# Patient Record
Sex: MALE | Race: WHITE
[De-identification: names, ages, dates, MRNs, and addresses within clinical notes are randomized per-mention and may not be internally consistent; named-entity substitution may affect disease eponyms.]

---

## 2020-09-09 ENCOUNTER — HOSPITAL ENCOUNTER (EMERGENCY)
Dept: HOSPITAL 97 - ER | Age: 40
Discharge: HOME | End: 2020-09-09
Payer: COMMERCIAL

## 2020-09-09 VITALS — DIASTOLIC BLOOD PRESSURE: 104 MMHG | SYSTOLIC BLOOD PRESSURE: 147 MMHG | TEMPERATURE: 98 F | OXYGEN SATURATION: 100 %

## 2020-09-09 DIAGNOSIS — L50.9: ICD-10-CM

## 2020-09-09 DIAGNOSIS — L03.211: Primary | ICD-10-CM

## 2020-09-09 PROCEDURE — 96372 THER/PROPH/DIAG INJ SC/IM: CPT

## 2020-09-09 PROCEDURE — 99283 EMERGENCY DEPT VISIT LOW MDM: CPT

## 2020-09-09 NOTE — ER
Nurse's Notes                                                                                     

 [facility]                                                                 

Name: Albert Potter                                                                                

Age: 40 yrs                                                                                       

Sex: Male                                                                                         

: 1980                                                                                   

MRN: [id number]                                                                                   

Arrival Date: 2020                                                                          

Time: 07:16                                                                                       

Account#: [id number]                                                                            

Bed 17                                                                                            

Private MD:                                                                                       

Diagnosis: Cellulitis of face;Urticaria, unspecified                                              

                                                                                                  

Presentation:                                                                                     

                                                                                             

07:23 Chief complaint: Patient states: rash on skin around eyes, started Saturday, no other   iw  

      area affected.                                                                              

07:23 Method Of Arrival: Ambulatory                                                           iw  

07:23 Coronavirus screen: At this time, the client does not indicate any symptoms associated  iw  

      with coronavirus-19. Ebola Screen: Patient negative for fever greater than or equal to      

      101.5 degrees Fahrenheit, and additional compatible Ebola Virus Disease symptoms            

      Patient denies exposure to infectious person. Patient denies travel to an                   

      Ebola-affected area in the 21 days before illness onset. No symptoms or risks               

      identified at this time. Initial Sepsis Screen: Does the patient meet any 2 criteria?       

      No. Patient's initial sepsis screen is negative. Does the patient have a suspected          

      source of infection? No. Patient's initial sepsis screen is negative. Risk Assessment:      

      Do you want to hurt yourself or someone else? Patient reports no desire to harm self or     

      others.                                                                                     

07:23 Acuity: HUMERA 5                                                                           iw  

07:36 Onset of symptoms was 2020.                                               iw  

                                                                                                  

Historical:                                                                                       

- Allergies:                                                                                      

07:25 No Known Allergies;                                                                     iw  

- Home Meds:                                                                                      

07:25 None [Active];                                                                          iw  

- PMHx:                                                                                           

07:25 None;                                                                                   iw  

- PSHx:                                                                                           

07:25 None;                                                                                   iw  

                                                                                                  

- Immunization history:: Adult Immunizations.                                                     

- Social history:: Smoking status: .                                                              

- Family history:: not pertinent.                                                                 

- Hospitalizations: : No recent hospitalization is reported.                                      

                                                                                                  

                                                                                                  

Screenin:26 Abuse screen: Denies threats or abuse. Denies injuries from another. Nutritional        iw  

      screening: No deficits noted. Tuberculosis screening: No symptoms or risk factors           

      identified. Fall Risk None identified.                                                      

                                                                                                  

Assessment:                                                                                       

07:25 General: Appears in no apparent distress. comfortable, Behavior is calm, cooperative.   iw  

      Pain: Denies pain. Neuro: Level of Consciousness is awake, alert, obeys commands,           

      Oriented to person, place, time, situation, Moves all extremities. Full function.           

      Cardiovascular: Patient's skin is warm and dry. Respiratory: Respiratory effort is          

      even, unlabored, Respiratory pattern is regular, symmetrical. GI: No signs and/or           

      symptoms were reported involving the gastrointestinal system. Derm: Skin is intact,         

      Rash noted that is red, urticaria, on face. Musculoskeletal: Range of motion: intact in     

      all extremities.                                                                            

                                                                                                  

Vital Signs:                                                                                      

07:23  / 104; Pulse 98; Resp 16 S; Temp 98.0; Pulse Ox 100% on R/A;                     iw  

                                                                                                  

ED Course:                                                                                        

07:16 Patient arrived in ED.                                                                  as  

07:18 Owen Cantu MD is Attending Physician.                                                rn  

07:24 Triage completed.                                                                       iw  

07:25 Arm band placed on.                                                                     iw  

07:25 Patient has correct armband on for positive identification.                             iw  

07:31 Jocelyne Mckeon RN is Primary Nurse.                                                   iw  

07:36 No provider procedures requiring assistance completed. Patient did not have IV access   iw  

      during this emergency room visit.                                                           

                                                                                                  

Administered Medications:                                                                         

07:31 Drug: Decadron 10 mg Route: IM; Site: right deltoid;                                    iw  

07:41 Follow up: Response: No adverse reaction                                                iw  

07:32 Drug: Bactrim (160 mg-800 mg (DS) 1 tablet Route: PO;                                   iw  

07:41 Follow up: Response: No adverse reaction                                                iw  

                                                                                                  

                                                                                                  

Outcome:                                                                                          

07:29 Discharge ordered by MD.                                                                rn  

07:36 Discharged to home ambulatory.                                                          iw  

07:36 Condition: good                                                                             

07:36 Discharge instructions given to patient, Instructed on discharge instructions, follow       

      up and referral plans. Demonstrated understanding of instructions, follow-up care,          

      medications, Prescriptions given X 2.                                                       

07:37 Patient left the ED.                                                                    iw  

                                                                                                  

Signatures:                                                                                       

Connie Emery                             as                                                   

Jocelyne Mckeon, TONY                     RN   iw                                                   

Owen Cantu MD MD   rn                                                   

                                                                                                  

**************************************************************************************************

## 2020-09-09 NOTE — EDPHYS
Physician Documentation                                                                           

 [facility]                                                                 

Name: Albert Potter                                                                                

Age: 40 yrs                                                                                       

Sex: Male                                                                                         

: 1980                                                                                   

MRN: [id number]                                                                                   

Arrival Date: 2020                                                                          

Time: 07:16                                                                                       

Account#: [id number]                                                                            

Bed 17                                                                                            

Private MD:                                                                                       

ED Physician Owen Cantu                                                                         

HPI:                                                                                              

                                                                                             

07:25 This 40 yrs old  Male presents to ER via Ambulatory with complaints of Rash.   rn  

07:25 The patient's rash thought to be caused by an unknown cause. The rash is located on the rn  

      face. The rash can be described as erythematous, urticarial.                                

07:25 Onset: The symptoms/episode began/occurred 4 day(s) ago. Severity of symptoms: At their rn  

      worst the symptoms were mild in the emergency department the symptoms are unchanged.        

      The patient has not experienced similar symptoms in the past. Reports 4 days ago began      

      to notice red splotches around eyes, feels burning and itchy, both eyes, no injury or       

      splash/chemical exposure, concerned maybe glasses had something on them, vision ok. No      

      fever. No rash anywhere else. + itchy. No medications or medical problems. .                

                                                                                                  

Historical:                                                                                       

- Allergies:                                                                                      

07:25 No Known Allergies;                                                                     iw  

- Home Meds:                                                                                      

07:25 None [Active];                                                                          iw  

- PMHx:                                                                                           

07:25 None;                                                                                   iw  

- PSHx:                                                                                           

07:25 None;                                                                                   iw  

                                                                                                  

- Immunization history:: Adult Immunizations.                                                     

- Social history:: Smoking status: .                                                              

- Family history:: not pertinent.                                                                 

- Hospitalizations: : No recent hospitalization is reported.                                      

                                                                                                  

                                                                                                  

ROS:                                                                                              

07:25 Constitutional: Negative for fever, chills, and weight loss, Eyes: Negative for injury, rn  

      and discharge, Neck: Negative for injury, pain, and swelling, Cardiovascular: Negative      

      for chest pain, palpitations, and edema, Respiratory: Negative for shortness of breath,     

      cough, wheezing, and pleuritic chest pain, Abdomen/GI: Negative for abdominal pain,         

      nausea, vomiting, diarrhea, and constipation, MS/Extremity: Negative for injury and         

      deformity, Skin: + rash and redness around eyes. Neuro: Negative for headache,              

      weakness, numbness, tingling, and seizure.                                                  

                                                                                                  

Exam:                                                                                             

07:25 Constitutional:  This is a well developed, well nourished patient who is awake, alert,  rn  

      and in no acute distress. Head/Face:  Normocephalic, atraumatic. Eyes:  Pupils equal        

      round and reactive to light, extra-ocular motions intact.  Lids and lashes normal.          

      Conjunctiva and sclera are non-icteric and not injected.  Cornea within normal limits.      

      Bilateral periorbital areas with urticarial lesions, no pustules, no drainage, no           

      fluctuance, + mild edema.  ENT:  No intraoral lesions. Respiratory:  No increased work      

      of breathing, no retractions or nasal flaring. Skin:  Warm, dry                             

                                                                                                  

Vital Signs:                                                                                      

07:23  / 104; Pulse 98; Resp 16 S; Temp 98.0; Pulse Ox 100% on R/A;                     iw  

                                                                                                  

MDM:                                                                                              

07:18 Patient medically screened.                                                             rn  

07:25 Differential diagnosis: allergic reaction, cellulitis, impetigo. Data reviewed: vital   rn  

      signs, nurses notes, and as a result, I will discharge patient. Counseling: I had a         

      detailed discussion with the patient and/or guardian regarding: the historical points,      

      exam findings, and any diagnostic results supporting the discharge/admit diagnosis, the     

      need for outpatient follow up, to return to the emergency department if symptoms worsen     

      or persist or if there are any questions or concerns that arise at home. Special            

      discussion: I discussed with the patient/guardian in detail that at this point there is     

      no indication for admission to the hospital. It is understood, however, that if the         

      symptoms persist or worsen the patient needs to return immediately for re-evaluation.       

                                                                                                  

Administered Medications:                                                                         

07:31 Drug: Decadron 10 mg Route: IM; Site: right deltoid;                                    iw  

07:41 Follow up: Response: No adverse reaction                                                iw  

07:32 Drug: Bactrim (160 mg-800 mg (DS) 1 tablet Route: PO;                                   iw  

07:41 Follow up: Response: No adverse reaction                                                iw  

                                                                                                  

                                                                                                  

Disposition:                                                                                      

20 07:29 Discharged to Home. Impression: Cellulitis of face, Urticaria, unspecified.        

- Condition is Stable.                                                                            

- Discharge Instructions: Cellulitis, Adult, Rash.                                                

- Prescriptions for Prednisone 20 mg Oral Tablet - take 3 tablet by ORAL route once               

  daily for 5 days; 15 tablet. Bactrim - 160 mg Oral Tablet - take 1 tablet by              

  ORAL route every 12 hours for 10 days; 20 tablet.                                               

- Medication Reconciliation Form, Thank You Letter, Antibiotic Education, Prescription            

  Opioid Use form.                                                                                

- Follow up: Private Physician; When: As needed; Reason: Recheck today's complaints,              

  Re-evaluation by your physician.                                                                

- Problem is new.                                                                                 

- Symptoms are unchanged.                                                                         

                                                                                                  

                                                                                                  

                                                                                                  

Signatures:                                                                                       

Jocelyne Mckeon RN                     RN   iw                                                   

Owen Cantu MD MD   rn                                                   

                                                                                                  

Corrections: (The following items were deleted from the chart)                                    

07:37 07:29 2020 07:29 Discharged to Home. Impression: Cellulitis of face; Urticaria,   iw  

      unspecified. Condition is Stable. Forms are Medication Reconciliation Form, Thank You       

      Letter, Antibiotic Education, Prescription Opioid Use. Follow up: Private Physician;        

      When: As needed; Reason: Recheck today's complaints, Re-evaluation by your physician.       

      Problem is new. Symptoms are unchanged. rn                                                  

                                                                                                  

**************************************************************************************************

## 2020-11-04 ENCOUNTER — HOSPITAL ENCOUNTER (EMERGENCY)
Dept: HOSPITAL 97 - ER | Age: 40
Discharge: HOME | End: 2020-11-04
Payer: COMMERCIAL

## 2020-11-04 VITALS — OXYGEN SATURATION: 98 % | TEMPERATURE: 97.3 F

## 2020-11-04 VITALS — SYSTOLIC BLOOD PRESSURE: 126 MMHG | DIASTOLIC BLOOD PRESSURE: 84 MMHG

## 2020-11-04 DIAGNOSIS — L23.9: Primary | ICD-10-CM

## 2020-11-04 PROCEDURE — 96372 THER/PROPH/DIAG INJ SC/IM: CPT

## 2020-11-04 PROCEDURE — 99283 EMERGENCY DEPT VISIT LOW MDM: CPT

## 2020-11-04 NOTE — EDPHYS
Physician Documentation                                                                           

 [facility]                                                                 

Name: Albert Potter                                                                                

Age: 40 yrs                                                                                       

Sex: Male                                                                                         

: 1980                                                                                   

MRN: [id number]                                                                                   

Arrival Date: 2020                                                                          

Time: 12:48                                                                                       

Account#: [id number]                                                                            

Bed 14                                                                                            

Private MD:                                                                                       

ED Physician Owen Cantu                                                                         

HPI:                                                                                              

                                                                                             

13:59 This 40 yrs old  Male presents to ER via Ambulatory with complaints of Rash,   kb  

      Eye Swelling.                                                                               

13:59 The patient's rash thought to be caused by an unknown cause. The rash is located on the kb  

      right eye, left eye, right arm and left arm. The rash can be described as erythematous.     

      Onset: The symptoms/episode began/occurred last night. Associated signs and symptoms:       

      Pertinent positives: itching, Pertinent negatives: burning sensation, difficulty            

      breathing, fever, nausea, Pain swelling of lips, swelling of throat, swelling of            

      tongue, vomiting, wheezing. Severity of symptoms: At their worst the symptoms were mild     

      in the emergency department the symptoms are unchanged. The patient has experienced a       

      previous episode. The patient has not recently seen a physician. Pt reports he noticed      

      itching to arms and eyes last night, this morning he had a rash to those areas. States      

      he has had this before, came here and got a shot that helped a lot. Came in now before      

      it got worse.                                                                               

                                                                                                  

Historical:                                                                                       

- Allergies:                                                                                      

13:10 No Known Allergies;                                                                     jd3 

- Home Meds:                                                                                      

13:10 None [Active];                                                                          jd3 

- PMHx:                                                                                           

13:10 None;                                                                                   jd3 

- PSHx:                                                                                           

13:10 left ankle; back;                                                                       jd3 

                                                                                                  

- Immunization history:: Adult Immunizations up to date.                                          

- Social history:: Smoking status: Patient denies any tobacco usage or history of.                

                                                                                                  

                                                                                                  

ROS:                                                                                              

13:58 Constitutional: Negative for fever, chills, and weight loss, Cardiovascular: Negative   kb  

      for chest pain, palpitations, and edema, Respiratory: Negative for shortness of breath,     

      cough, wheezing, and pleuritic chest pain, Abdomen/GI: Negative for abdominal pain,         

      nausea, vomiting, diarrhea, and constipation, Back: Negative for injury and pain,           

      MS/Extremity: Negative for injury and deformity, Neuro: Negative for headache,              

      weakness, numbness, tingling, and seizure.                                                  

13:58 Skin: Positive for rash, of the right eye, left eye, right arm and left arm.                

                                                                                                  

Exam:                                                                                             

13:58 Constitutional:  This is a well developed, well nourished patient who is awake, alert,  kb  

      and in no acute distress. Head/Face:  Normocephalic, atraumatic. Chest/axilla:  Normal      

      chest wall appearance and motion.  Nontender with no deformity.  No lesions are             

      appreciated. Cardiovascular:  Regular rate and rhythm with a normal S1 and S2.  No          

      gallops, murmurs, or rubs.  Normal PMI, no JVD.  No pulse deficits. Respiratory:  Lungs     

      have equal breath sounds bilaterally, clear to auscultation and percussion.  No rales,      

      rhonchi or wheezes noted.  No increased work of breathing, no retractions or nasal          

      flaring. Abdomen/GI:  Soft, non-tender, with normal bowel sounds.  No distension or         

      tympany.  No guarding or rebound.  No evidence of tenderness throughout. MS/ Extremity:     

       Pulses equal, no cyanosis.  Neurovascular intact.  Full, normal range of motion.           

      Neuro:  Awake and alert, GCS 15, oriented to person, place, time, and situation.            

      Cranial nerves II-XII grossly intact.  Motor strength 5/5 in all extremities.  Sensory      

      grossly intact.  Cerebellar exam normal.  Normal gait.                                      

13:58 Skin: rash a mild rash is noted, consistent with  contact dermatitis, on the left arm       

      and right arm and left eye and right eye.                                                   

                                                                                                  

Vital Signs:                                                                                      

13:10  / 90; Pulse 84; Resp 17 S; Temp 97.3(TE); Pulse Ox 98% on R/A; Weight 113.4 kg   jd3 

      (R); Height 6 ft. 0 in. (182.88 cm) (R); Pain 0/10;                                         

14:23  / 84; Pulse 81; Resp 16 S; Pulse Ox 98% on R/A;                                  ca1 

13:10 Body Mass Index 33.91 (113.40 kg, 182.88 cm)                                            jd3 

                                                                                                  

MDM:                                                                                              

13:46 Patient medically screened.                                                             kb  

13:58 Data reviewed: vital signs, nurses notes. Data interpreted: Pulse oximetry: on room air kb  

      is 98 %. Interpretation: normal. Counseling: I had a detailed discussion with the           

      patient and/or guardian regarding: the historical points, exam findings, and any            

      diagnostic results supporting the discharge/admit diagnosis, the need for outpatient        

      follow up, a family practitioner, to return to the emergency department if symptoms         

      worsen or persist or if there are any questions or concerns that arise at home.             

                                                                                                  

Administered Medications:                                                                         

14:03 Drug: SOLU-Medrol 125 mg Route: IM; Site: right gluteus;                                ca1 

14:23 Follow up: Response: No adverse reaction                                                ca1 

                                                                                                  

                                                                                                  

Disposition:                                                                                      

16:31 Co-signature as Attending Physician, Owen Cantu MD.                                    rn  

                                                                                                  

Disposition:                                                                                      

20 14:01 Discharged to Home. Impression: Allergic contact dermatitis.                       

- Condition is Stable.                                                                            

- Discharge Instructions: Contact Dermatitis, Easy-to-Read.                                       

- Prescriptions for Pepcid 20 mg Oral Tablet - take 1 tablet by ORAL route every 12               

  hours for 5 days; 10 tablet. Prednisone 20 mg Oral Tablet - take 1 tablet by ORAL               

  route once daily for 5 days; 5 tablet.                                                          

- Medication Reconciliation Form, Thank You Letter, Antibiotic Education, Prescription            

  Opioid Use form.                                                                                

- Follow up: Emergency Department; When: As needed; Reason: Worsening of condition.               

  Follow up: Private Physician; When: 2 - 3 days; Reason: Recheck today's complaints,             

  Continuance of care, Re-evaluation by your physician.                                           

                                                                                                  

                                                                                                  

                                                                                                  

Signatures:                                                                                       

Ivana Arteaga, SAFIAP-C                 FNP-Ckb                                                   

Owen Cantu MD MD rn Davies, Jonathon, RN                    RN   jd3                                                  

Candi Renteria RN                        RN   ca1                                                  

                                                                                                  

Corrections: (The following items were deleted from the chart)                                    

14:26 14:01 2020 14:01 Discharged to Home. Impression: Allergic contact dermatitis.     ca1 

      Condition is Stable. Forms are Medication Reconciliation Form, Thank You Letter,            

      Antibiotic Education, Prescription Opioid Use. Follow up: Emergency Department; When:       

      As needed; Reason: Worsening of condition. Follow up: Private Physician; When: 2 - 3        

      days; Reason: Recheck today's complaints, Continuance of care, Re-evaluation by your        

      physician. kb                                                                               

                                                                                                  

**************************************************************************************************

## 2020-11-04 NOTE — ER
Nurse's Notes                                                                                     

 [facility]                                                                 

Name: Albert Potter                                                                                

Age: 40 yrs                                                                                       

Sex: Male                                                                                         

: 1980                                                                                   

MRN: [id number]                                                                                   

Arrival Date: 2020                                                                          

Time: 12:48                                                                                       

Account#: [id number]                                                                            

Bed 14                                                                                            

Private MD:                                                                                       

Diagnosis: Allergic contact dermatitis                                                            

                                                                                                  

Presentation:                                                                                     

                                                                                             

13:09 Chief complaint: Patient states: "I have this rash popping up and I don't know why.".   jd3 

      Coronavirus screen: At this time, the client does not indicate any symptoms associated      

      with coronavirus-19. Ebola Screen: Patient negative for fever greater than or equal to      

      101.5 degrees Fahrenheit, and additional compatible Ebola Virus Disease symptoms.           

      Initial Sepsis Screen: Does the patient meet any 2 criteria? No. Patient's initial          

      sepsis screen is negative. Does the patient have a suspected source of infection? No.       

      Patient's initial sepsis screen is negative. Risk Assessment: Do you want to hurt           

      yourself or someone else? Patient reports no desire to harm self or others. Onset of        

      symptoms was 2020.                                                             

13:09 Method Of Arrival: Ambulatory                                                           jd3 

13:09 Acuity: HUMERA 4                                                                           jd3 

                                                                                                  

Historical:                                                                                       

- Allergies:                                                                                      

13:10 No Known Allergies;                                                                     jd3 

- Home Meds:                                                                                      

13:10 None [Active];                                                                          jd3 

- PMHx:                                                                                           

13:10 None;                                                                                   jd3 

- PSHx:                                                                                           

13:10 left ankle; back;                                                                       jd3 

                                                                                                  

- Immunization history:: Adult Immunizations up to date.                                          

- Social history:: Smoking status: Patient denies any tobacco usage or history of.                

                                                                                                  

                                                                                                  

Screenin:50 Abuse screen: Denies threats or abuse. Denies injuries from another. Nutritional        ca1 

      screening: No deficits noted. Tuberculosis screening: No symptoms or risk factors           

      identified. Fall Risk None identified.                                                      

                                                                                                  

Assessment:                                                                                       

13:50 General: Appears in no apparent distress. comfortable, Behavior is calm, cooperative,   ca1 

      appropriate for age. Pain: Denies pain. Neuro: Level of Consciousness is awake, alert,      

      obeys commands, Oriented to person, place, time, situation. Derm: Skin is intact, is        

      healthy with good turgor, Skin is pink, warm \T\ dry. Rash noted that is red, urticaria,    

      on right eye, left eye, right arm and left arm. Musculoskeletal: Circulation, motion,       

      and sensation intact. Capillary refill < 3 seconds.                                         

14:23 Reassessment: Patient appears in no apparent distress at this time. Patient is alert,   ca1 

      oriented x 3, equal unlabored respirations, skin warm/dry/pink. Patient states feeling      

      better.                                                                                     

                                                                                                  

Vital Signs:                                                                                      

13:10  / 90; Pulse 84; Resp 17 S; Temp 97.3(TE); Pulse Ox 98% on R/A; Weight 113.4 kg   jd3 

      (R); Height 6 ft. 0 in. (182.88 cm) (R); Pain 0/10;                                         

14:23  / 84; Pulse 81; Resp 16 S; Pulse Ox 98% on R/A;                                  ca1 

13:10 Body Mass Index 33.91 (113.40 kg, 182.88 cm)                                            jd3 

                                                                                                  

ED Course:                                                                                        

12:48 Patient arrived in ED.                                                                  as  

13:10 Triage completed.                                                                       jd3 

13:12 Arm band placed on.                                                                     jd3 

13:46 [facility]                                                        kb  

13:46 Owen Cantu MD is Attending Physician.                                                kb  

13:50 Patient has correct armband on for positive identification. Bed in low position. Call   ca1 

      light in reach. Side rails up X 1. Pulse ox on. NIBP on.                                    

13:53 Acob, Candi, RN is Primary Nurse.                                                      ca1 

14:05 No provider procedures requiring assistance completed. Patient did not have IV access   ca1 

      during this emergency room visit.                                                           

                                                                                                  

Administered Medications:                                                                         

14:03 Drug: SOLU-Medrol 125 mg Route: IM; Site: right gluteus;                                ca1 

14:23 Follow up: Response: No adverse reaction                                                ca1 

                                                                                                  

                                                                                                  

Outcome:                                                                                          

14:01 Discharge ordered by MD.                                                                kb  

14:25 Discharged to home ambulatory.                                                          ca1 

14:25 Condition: stable                                                                           

14:25 Discharge instructions given to patient, Instructed on discharge instructions, follow       

      up and referral plans. medication usage, Demonstrated understanding of instructions,        

      follow-up care, medications, Prescriptions given X 2.                                       

14:26 Patient left the ED.                                                                    ca1 

                                                                                                  

Signatures:                                                                                       

Ivana Arteaga FNP-C FNP-Ckb Martinez, Amelia as Davies, Jonathon, RN                    RN   jd3                                                  

Candi Renteria RN                        RN   ca1                                                  

                                                                                                  

Corrections: (The following items were deleted from the chart)                                    

13:13 13:10 Pulse 84bpm; Resp 17bpm; Pulse Ox 98% RA; Temp 97.3F Temporal; 113.4 kg Reported; jd3 

      Height 6 ft. 0 in. Reported; BMI: 33.9; Pain 0/10; jd3                                      

                                                                                                  

**************************************************************************************************

## 2020-11-08 ENCOUNTER — HOSPITAL ENCOUNTER (EMERGENCY)
Dept: HOSPITAL 97 - ER | Age: 40
Discharge: HOME | End: 2020-11-08
Payer: COMMERCIAL

## 2020-11-08 VITALS — OXYGEN SATURATION: 100 % | SYSTOLIC BLOOD PRESSURE: 143 MMHG | TEMPERATURE: 98 F | DIASTOLIC BLOOD PRESSURE: 77 MMHG

## 2020-11-08 DIAGNOSIS — L50.9: Primary | ICD-10-CM

## 2020-11-08 PROCEDURE — 96372 THER/PROPH/DIAG INJ SC/IM: CPT

## 2020-11-08 PROCEDURE — 99283 EMERGENCY DEPT VISIT LOW MDM: CPT

## 2020-11-08 NOTE — ER
Nurse's Notes                                                                                     

 [facility]                                                                 

Name: Albert Potter                                                                                

Age: 40 yrs                                                                                       

Sex: Male                                                                                         

: 1980                                                                                   

MRN: [id number]                                                                                   

Arrival Date: 2020                                                                          

Time: 13:58                                                                                       

Account#: [id number]                                                                            

Bed 18                                                                                            

Private MD: Erlin Montenegro R                                                                       

Diagnosis: Urticaria, unspecified                                                                 

                                                                                                  

Presentation:                                                                                     

                                                                                             

14:13 Chief complaint: Patient states: Rash to upper body since last visit here. Rash has     ll1 

      spread down both arms and into pelvic region. + itching. Coronavirus screen: Client         

      denies travel out of the [place] in the last 14 days. At this time, the client does not        

      indicate any symptoms associated with coronavirus-19. Ebola Screen: Patient denies          

      travel to an Ebola-affected area in the 21 days before illness onset. Initial Sepsis        

      Screen: Does the patient meet any 2 criteria? HR > 90 bpm. No. Patient's initial sepsis     

      screen is negative. Does the patient have a suspected source of infection? Yes: Skin        

      breakdown/wound. Risk Assessment: Do you want to hurt yourself or someone else? Patient     

      reports no desire to harm self or others. Onset of symptoms is unknown.                     

14:13 Method Of Arrival: Ambulatory                                                           ll1 

14:13 Acuity: HUMERA 3                                                                           ll1 

                                                                                                  

Triage Assessment:                                                                                

14:15 General: Appears uncomfortable, Behavior is calm, cooperative, appropriate for age.     ll1 

                                                                                                  

Historical:                                                                                       

- Allergies:                                                                                      

14:13 No Known Allergies;                                                                     ll1 

- PSHx:                                                                                           

14:13 left ankle; back;                                                                       ll1 

                                                                                                  

- Immunization history:: Flu vaccine is not up to date.                                           

- Social history:: Smoking status: Patient denies any tobacco usage or history of.                

                                                                                                  

                                                                                                  

Screenin:14 Abuse screen: Denies threats or abuse. Nutritional screening: No deficits noted.        ll1 

      Tuberculosis screening: No symptoms or risk factors identified. Fall Risk None              

      identified. Total Martinez Fall Scale indicates No Risk (0-24 pts).                            

                                                                                                  

Assessment:                                                                                       

14:15 General: Appears in no apparent distress. Behavior is calm, cooperative, appropriate    ll1 

      for age. Pain: Denies pain. Neuro: No deficits noted. Cardiovascular: No deficits           

      noted. Respiratory: No deficits noted. Derm: Rash noted that is red, raised, urticaria,     

      Reports rash to arms/trunk that has spread to waistline now.                                

                                                                                                  

Vital Signs:                                                                                      

14:13  / 77; Pulse 100; Resp 18; Temp 98.0; Pulse Ox 100% on R/A; Pain 0/10;            ll1 

16:40  / 101; Pulse 82; Resp 17; Pulse Ox 100% ; Pain 0/10;                             ll1 

                                                                                                  

ED Course:                                                                                        

13:58 Patient arrived in ED.                                                                  mr  

13:58 Erlin Montenegro MD is Private Physician.                                                  mr  

14:08 [facility]                                                           pm1 

14:08 Micah Mcclure MD is Attending Physician.                                             pm1 

14:08 George Curry RN is Primary Nurse.                                                     ll1 

14:08 Arm band placed on Patient placed in an exam room, on a stretcher.                      ll1 

14:14 Triage completed.                                                                       ll1 

14:15 Patient has correct armband on for positive identification. Bed in low position. Call   ll1 

      light in reach. Side rails up X 1. Pulse ox on. NIBP on.                                    

16:40 No provider procedures requiring assistance completed. Patient did not have IV access   ll1 

      during this emergency room visit.                                                           

                                                                                                  

Administered Medications:                                                                         

14:31 Drug: Decadron 10 mg Route: IM; Site: right gluteus;                                    ll1 

16:30 Follow up: Response: No adverse reaction; RASS: Alert and Calm (0)                      ll1 

14:31 Drug: Pepcid 20 mg Route: PO;                                                           ll1 

16:40 Follow up: Response: No adverse reaction; RASS: Alert and Calm (0)                      ll1 

14:31 Drug: Benadryl 25 mg Route: PO;                                                         ll1 

16:40 Follow up: Response: No adverse reaction; RASS: Alert and Calm (0)                      ll1 

                                                                                                  

                                                                                                  

Outcome:                                                                                          

16:17 Discharge ordered by MD.                                                                pm1 

16:40 Patient left the ED.                                                                    ll1 

16:40 Discharged to home ambulatory.                                                          ll1 

16:40 Condition: stable                                                                           

16:40 Discharge instructions given to patient, Instructed on discharge instructions, follow       

      up and referral plans. medication usage, Demonstrated understanding of instructions,        

      follow-up care, medications, Prescriptions given X 3.                                       

                                                                                                  

Signatures:                                                                                       

Marly Mays                                 mr                                                   

Roberto Fitch, NP                    NP   pm1                                                  

George Curry, TONY                       RN   1                                                  

                                                                                                  

**************************************************************************************************